# Patient Record
Sex: MALE | URBAN - METROPOLITAN AREA
[De-identification: names, ages, dates, MRNs, and addresses within clinical notes are randomized per-mention and may not be internally consistent; named-entity substitution may affect disease eponyms.]

---

## 2019-04-10 ENCOUNTER — DOCUMENTATION ONLY (OUTPATIENT)
Dept: ORTHOPEDICS | Facility: CLINIC | Age: 24
End: 2019-04-10

## 2019-04-10 NOTE — PROGRESS NOTES
Dr. Hughes requested Dr. Ovalle to review films for patient.  Letter has been dictated, transcribed, and faxed to Dr. Hughes on 4/10/19.  Clinic notes in my office as they cannot be scanned in due to patient has not been seen here.    Abiola saucedo